# Patient Record
Sex: FEMALE | URBAN - METROPOLITAN AREA
[De-identification: names, ages, dates, MRNs, and addresses within clinical notes are randomized per-mention and may not be internally consistent; named-entity substitution may affect disease eponyms.]

---

## 2021-12-08 ENCOUNTER — HISTORICAL (OUTPATIENT)
Dept: ADMINISTRATIVE | Facility: HOSPITAL | Age: 56
End: 2021-12-08

## 2022-04-30 NOTE — H&P
Patient:   Miguel Angel Beauchamp             MRN: [id number]            FIN: [id number]               Age:   56 years     Sex:  Female     :  1965   Associated Diagnoses:   Sludge in gallbladder   Author:   Miriam Watts      Basic Information   Source of history:  Self.       Chief Complaint   Abd pain      History of Present Illness   Mrs. Beauchamp is a 57 yo established patient that [facility].  Pt c/o intermittent RUQ abd pain and diarrhea. Ward after meals. Associated with abd bloating and indigestion. Pt reports her first attack of abd pain was about 15 years ago. Recently started developing more frequent attacks and worsening diarrhea. No changes in symptoms since last office visit in 2021. Ready to proceed with cholecystectomy.     Abd US complete [facility] 10/19/21: gallbladder sludge,? fatty liver.     Labs from PCP 10/11/21 ok.     PCP Dr. Nixon Dorman ([place], [place])       Review of Systems   Positive symptoms: intermittent RUQ abd pain, abd bloating, diarrhea, indigestion.      Health Status   Allergies:    Allergic Reactions (Selected)  Severity Not Documented  Lipitor- No reactions were documented.   Current medications:  (Selected)   Documented Medications  Documented  Bystolic 5 mg oral tablet: 5 mg = 1 tab(s), Oral, BID, # 60 tab(s), 0 Refill(s)  MINIVELLE 0.1 MG PATCH: = 1 patch(es), TOP, 2x/Wk  Multiple Vitamins oral cap: 1 cap(s), Oral, Daily, 0 Refill(s)  Potassium Chloride (Eqv-K-Tab) 10 mEq oral tablet, extended release: 10 mEq = 1 tab(s), Oral, M,W,F, 0 Refill(s)  aspirin 81 mg oral Delayed Release (EC) tablet: 81 mg = 1 tab(s), Oral, Daily, # 30 tab(s), 0 Refill(s)  cetirizine 10 mg oral tablet: 10 mg = 1 tab(s), Oral, Daily, # 30 tab(s), 0 Refill(s)  famotidine 20 mg oral tablet: 20 mg = 1 tab(s), Oral, Daily, PRN PRN ACID REFLUX, # 30 tab(s), 0 Refill(s)  fluocinonide 0.05% topical solution: 1 tera, TOP, As Directed, PRN PRN not specified, TO  SCALP, # 60 mL, 0 Refill(s)  hydrochlorothiazide 12.5 mg oral tablet: 12.5 mg = 1 tab(s), Oral, qMWF  rosuvastatin 10 mg oral tablet: 10 mg = 1 tab(s), Oral, Daily, # 30 tab(s), 0 Refill(s)  tretinoin 0.01% topical gel: 1 tera, TOP, 3-5X/WK, 0 Refill(s)   Problem list:    All Problems  Angina / SNOMED CT 860575511 / Confirmed  Hyperlipidemia / SNOMED CT 16896769 / Confirmed  Hypertension / SNOMED CT 02783151 / Confirmed  Acid reflux / SNOMED CT 4340793359 / Confirmed  Obesity / SNOMED CT 4288315197 / Probable      Histories   Past Medical History:    Resolved  Coronary artery disease (9799392188):  Resolved.  Hypertension (30670161):  Resolved.  Hyperlipemia ([id number]):  Resolved.   Family History:    Breast cancer  Mother  Coronary artery bypass graft  Father ()  CAD - Coronary artery disease  Father ()  Mother  Hypertension.  Father ()  Mother     Procedure history:    Hysterectomy (SNOMED CT 736079783).  Mastectomy (SNOMED CT 8539097215).  Comments:  2021 11:06 Anais Figueroa RN  ELECTIVE    2016 23:51 KARON - Alce BAUER, Patricia FERGUSON.  bilateral  SURGICAL REMOVAL OF KIDNEY STONE.  ATTEMPTED BASKET EXTRACTION OF KIDNEY STONE.  Comments:  2021 10:47 Anais Figueroa RN  STATES STONE WAS TOO LARGE TO REMOVE WITH BASKET.  ORAL SURGERY.  LEON BREAST RECONSTRUCTIVE SURGERY.   Social History        Social & Psychosocial Habits    Alcohol  2016 Risk Assessment: Denies Alcohol Use    2021  Use: Current    Type: Beer, Wine    Comment: 1X/WK - 2021 11:03 Anais Fuller RN    Substance Use  2016 Risk Assessment: Denies Substance Abuse      Comment: DENIES. - 2021 11:03 Anais Fuller RN    Tobacco  2016 Risk Assessment: Denies Tobacco Use    2021  Use: Former smoker, quit more    Type: Cigarettes    Patient Wants Consult For Cessation Counseling N/A    Comment: FORMER [employer]. - 2021 11:03  - Naveed BAUER, Averil    Abuse/Neglect  12/03/2021  SHX Any signs of abuse or neglect No    Feels unsafe at home: No    Safe place to go: Yes  .        Impression and Plan   Diagnosis     Sludge in gallbladder (MXJ18-GY K82.8).     Lap cholecystectomy today

## 2022-04-30 NOTE — OP NOTE
Patient:   Miguel Angel Beauchamp             MRN: [id number]            FIN: [id number]               Age:   56 years     Sex:  Female     :  1965   Associated Diagnoses:   Cholelithiasis; Calculous cholecystitis   Author:   Tae Sawant MD      Operative Note   Operative Information   Procedures Performed.     Indications.     Preoperative Diagnosis: Cholelithiasis (ZRP08-TW K80.20), Calculous cholecystitis (ASM32-UL K80.10).     Postoperative Diagnosis: Cholelithiasis (FJN58-SE K80.20), Calculous cholecystitis (TCS97-WC K80.10).     Surgeon: Tae Sawant MD.     Assistant: Miriam Watts     Anesthesia: Gen..     Speciman Removed: gallbladder.     Description of Procedure/Findings/    Complications:     The patient was taken to the operating room. Patient was placed under general anesthesia. Abdomen was prepped and draped in the usual sterile fashion. An appropriate timeout was performed. Optical tipped trocar was used to access the peritoneal cavity. Pneumoperitoneum was instituted. Abdominal exploration was negative. Gallbladder was noted and held in a cephalad direction. The infundibulum was noted and held in a lateral direction. The peritoneum overlying the infundibulum was dissected revealing the cystic duct gallbladder junction and the cystic artery. The critical view was obtained. The cystic duct and cystic artery were clipped and transected. The gallbladder was removed from the undersurface of the liver with sharp and electric dissection. Hemostasis was assured. The clips were in excellent position and there was no bleeding or leakage of bile. Gallbladder was completely removed from the liver hemostasis was assured. The gallbladder was removed from the abdomen. Once again hemostasis was assured the operative site was irrigated until clear. Trochars were removed and pneumoperitoneum released the incisions were closed with Vicryl..     Esimated blood loss: loss less than  15  cc.      Complications: None.